# Patient Record
Sex: MALE | Race: WHITE
[De-identification: names, ages, dates, MRNs, and addresses within clinical notes are randomized per-mention and may not be internally consistent; named-entity substitution may affect disease eponyms.]

---

## 2019-10-18 ENCOUNTER — HOSPITAL ENCOUNTER (EMERGENCY)
Dept: HOSPITAL 56 - MW.ED | Age: 24
Discharge: HOME | End: 2019-10-18
Payer: COMMERCIAL

## 2019-10-18 DIAGNOSIS — S43.005A: Primary | ICD-10-CM
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
## 2019-10-18 DIAGNOSIS — F17.210: ICD-10-CM

## 2019-10-18 NOTE — EDM.PDOC
ED HPI GENERAL MEDICAL PROBLEM





- General


Chief Complaint: Upper Extremity Injury/Pain


Stated Complaint: LEFT SHOULDER PAIN


Time Seen by Provider: 10/18/19 10:17


Source of Information: Reports: Patient


History Limitations: Reports: No Limitations





- History of Present Illness


INITIAL COMMENTS - FREE TEXT/NARRATIVE: 





HISTORY AND PHYSICAL:





History of present illness:


Patient is a 23-year-old male presents to the ED with complaint of left 

shoulder pain. He states he fell landing directly on his left shoulder and 

thinks he dislocated it. He states his friend unsuccessfully tried to put it 

in. He denies head injury or LOC. 





Review of systems: 


As per history of present illness and below otherwise all systems reviewed and 

negative.





Past medical history: 


As per history of present illness and as reviewed below otherwise 

noncontributory.





Surgical history: 


As per history of present illness and as reviewed below otherwise 

noncontributory.





Social history: 


No reported history of drug or alcohol abuse.





Family history: 


As per history of present illness and as reviewed below otherwise 

noncontributory.





Physical exam:


General: Patient sitting comfortably in no acute distress and nontoxic appearing


HEENT: Atraumatic, normocephalic, pupils reactive, negative for conjunctival 

pallor or scleral icterus, mucous membranes moist, throat clear, neck supple, 

nontender, trachea midline. No meningeal signs. 


Lungs: Clear to auscultation, breath sounds equal bilaterally, chest nontender.


Heart: S1S2, regular, negative for clicks, rubs, or overt murmur.


Abdomen: Soft, nondistended, nontender. Negative for masses or 

hepatosplenomegaly. Negative for costovertebral tenderness. No rigidity, rebound

, guarding.


Pelvis: Stable nontender.


Genitourinary: Deferred.


Rectal: Deferred.


Extremities: Left shoulder is squared off, patient is unable to bring left hand 

to right shoulder. Atraumatic, negative for cords or calf pain. Neurovascular 

unremarkable.


Neuro: Awake, alert, oriented. Cranial nerves II through XII unremarkable. 

Cerebellum unremarkable. Motor and sensory unremarkable throughout. Exam 

nonfocal.





Notes: x-ray ray shows anterior shoulder dislocation. Shoulder was successfully 

reduced using traction/countertraction. Patient tolerated well. 





Diagnostics:


pre and post reduction left shoulder x-ray 





Therapeutics:


Shoulder immobilizer 





Prescriptions:








Impression: 


Left shoulder dislocation 





Plan:


Wear immobilizer as instructed


Follow up with orthopedics, please call the number provided to schedule an 

appointment


Return to ED as needed as discussed 





Definitive disposition and diagnosis as appropriate pending reevaluation and 

review of above.





  ** left shoulder


Pain Score (Numeric/FACES): 7





- Related Data


 Allergies











Allergy/AdvReac Type Severity Reaction Status Date / Time


 


No Known Allergies Allergy   Verified 10/18/19 10:08











Home Meds: 


 Home Meds





. [No Known Home Meds]  10/18/19 [History]











Past Medical History





- Past Health History


Medical/Surgical History: Denies Medical/Surgical History





- Past Surgical History


GI Surgical History: Reports: Hernia Repair/Other





Social & Family History





- Family History


Family Medical History: Noncontributory





- Tobacco Use


Smoking Status *Q: Current Every Day Smoker


Years of Tobacco use: 1


Packs/Tins Daily: 7





- Recreational Drug Use


Recreational Drug Use: No





Review of Systems





- Review of Systems


Review Of Systems: ROS reveals no pertinent complaints other than HPI.





ED EXAM, GENERAL





- Physical Exam


Exam: See Below (see dictation)





Course





- Vital Signs


Last Recorded V/S: 


 Last Vital Signs











Temp  96.4 F   10/18/19 10:06


 


Pulse  73   10/18/19 10:06


 


Resp  18   10/18/19 10:06


 


BP  125/74   10/18/19 10:06


 


Pulse Ox  98   10/18/19 10:06














- Orders/Labs/Meds


Orders: 


 Active Orders 24 hr











 Category Date Time Status


 


 Shoulder 1V Lt [CR] Stat Exams  10/18/19 11:06 Ordered














Departure





- Departure


Time of Disposition: 11:21


Disposition: Home, Self-Care 01


Condition: Good


Clinical Impression: 


 Dislocation of left shoulder joint








- Discharge Information


Referrals: 


PCP,None [Primary Care Provider] - 


Forms:  ED Department Discharge


Additional Instructions: 


The following information is given to patients seen in the emergency department 

who are being discharged to home. This information is to outline your options 

for follow-up care. We provide all patients seen in our emergency department 

with a follow-up referral.





The need for follow-up, as well as the timing and circumstances, are variable 

depending upon the specifics of your emergency department visit.





If you don't have a primary care physician on staff, we will provide you with a 

referral. We always advise you to contact your personal physician following an 

emergency department visit to inform them of the circumstance of the visit and 

for follow-up with them and/or the need for any referrals to a consulting 

specialist.





The emergency department will also refer you to a specialist when appropriate. 

This referral assures that you have the opportunity for follow-up care with a 

specialist. All of these measure are taken in an effort to provide you with 

optimal care, which includes your follow-up.





Under all circumstances we always encourage you to contact your private 

physician who remains a resource for coordinating your care. When calling for 

follow-up care, please make the office aware that this follow-up is from your 

recent emergency room visit. If for any reason you are refused follow-up, 

please contact the [facility] Emergency 

Department at [phone] and asked to speak to the emergency department 

charge nurse.





[facility]


Specialty Care - Orthopedic Clinic


[facility]


[address] [place] [address], [address]


[place] [address]


Phone: [phone]





Dr Reynolds, Orthopedist


[facility]


[address] [place] [address]


Phone: [phone]





Dr López - Dr Guzman - Dr Peraza 


Orthopedics at [facility]


[address] [place] [address]


Phone: [phone]





[facility]


[facility]


[address]


[place] [address]


Phone: [phone]








Wear immobilizer as instructed


Follow up with orthopedics, please call the number provided to schedule an 

appointment


Return to ED as needed as discussed 





- My Orders


Last 24 Hours: 


My Active Orders





10/18/19 11:06


Shoulder 1V Lt [CR] Stat 














- Assessment/Plan


Last 24 Hours: 


My Active Orders





10/18/19 11:06


Shoulder 1V Lt [CR] Stat

## 2019-10-18 NOTE — CR
Left shoulder: Three views of the left shoulder were obtained.



Comparison: No previous shoulder study.



Dislocated left shoulder is seen in an anterior direction.  No discrete 
fracture or other abnormality is appreciated.



Impression: Anterior left shoulder dislocation.



Diagnostic code #5
MTDD

## 2019-10-18 NOTE — CR
Left shoulder: Single AP view of the left shoulder was obtained.



Comparison: Previous left shoulder study performed earlier on same day (10:36 AM
).



Previous dislocation shows reduction.  No discrete fracture or other 
abnormality is seen.



Impression:  Previous dislocation shows evidence of reduction.



Diagnostic code #1
MTDD